# Patient Record
Sex: FEMALE | Race: WHITE
[De-identification: names, ages, dates, MRNs, and addresses within clinical notes are randomized per-mention and may not be internally consistent; named-entity substitution may affect disease eponyms.]

---

## 2018-12-22 ENCOUNTER — HOSPITAL ENCOUNTER (EMERGENCY)
Dept: HOSPITAL 62 - ER | Age: 58
LOS: 1 days | Discharge: HOME | End: 2018-12-23
Payer: MEDICARE

## 2018-12-22 DIAGNOSIS — R19.7: ICD-10-CM

## 2018-12-22 DIAGNOSIS — R39.11: ICD-10-CM

## 2018-12-22 DIAGNOSIS — E89.0: ICD-10-CM

## 2018-12-22 DIAGNOSIS — J45.909: ICD-10-CM

## 2018-12-22 DIAGNOSIS — D72.829: ICD-10-CM

## 2018-12-22 DIAGNOSIS — L29.9: ICD-10-CM

## 2018-12-22 DIAGNOSIS — K21.9: ICD-10-CM

## 2018-12-22 DIAGNOSIS — Z79.899: ICD-10-CM

## 2018-12-22 DIAGNOSIS — R31.9: ICD-10-CM

## 2018-12-22 DIAGNOSIS — Z88.6: ICD-10-CM

## 2018-12-22 DIAGNOSIS — Z88.8: ICD-10-CM

## 2018-12-22 DIAGNOSIS — N20.0: Primary | ICD-10-CM

## 2018-12-22 DIAGNOSIS — R30.0: ICD-10-CM

## 2018-12-22 DIAGNOSIS — Z79.891: ICD-10-CM

## 2018-12-22 DIAGNOSIS — R11.0: ICD-10-CM

## 2018-12-22 PROCEDURE — 96361 HYDRATE IV INFUSION ADD-ON: CPT

## 2018-12-22 PROCEDURE — 36415 COLL VENOUS BLD VENIPUNCTURE: CPT

## 2018-12-22 PROCEDURE — 96375 TX/PRO/DX INJ NEW DRUG ADDON: CPT

## 2018-12-22 PROCEDURE — 76770 US EXAM ABDO BACK WALL COMP: CPT

## 2018-12-22 PROCEDURE — 99284 EMERGENCY DEPT VISIT MOD MDM: CPT

## 2018-12-22 PROCEDURE — 96374 THER/PROPH/DIAG INJ IV PUSH: CPT

## 2018-12-22 PROCEDURE — 80053 COMPREHEN METABOLIC PANEL: CPT

## 2018-12-22 PROCEDURE — 81001 URINALYSIS AUTO W/SCOPE: CPT

## 2018-12-22 PROCEDURE — 87210 SMEAR WET MOUNT SALINE/INK: CPT

## 2018-12-22 PROCEDURE — 85025 COMPLETE CBC W/AUTO DIFF WBC: CPT

## 2018-12-23 VITALS — DIASTOLIC BLOOD PRESSURE: 65 MMHG | SYSTOLIC BLOOD PRESSURE: 125 MMHG

## 2018-12-23 LAB
ADD MANUAL DIFF: NO
ALBUMIN SERPL-MCNC: 4.2 G/DL (ref 3.5–5)
ALP SERPL-CCNC: 81 U/L (ref 38–126)
ALT SERPL-CCNC: 21 U/L (ref 9–52)
ANION GAP SERPL CALC-SCNC: 8 MMOL/L (ref 5–19)
APPEARANCE UR: (no result)
APTT PPP: YELLOW S
AST SERPL-CCNC: 25 U/L (ref 14–36)
BASOPHILS # BLD AUTO: 0 10^3/UL (ref 0–0.2)
BASOPHILS NFR BLD AUTO: 0.3 % (ref 0–2)
BILIRUB DIRECT SERPL-MCNC: 0.2 MG/DL (ref 0–0.4)
BILIRUB SERPL-MCNC: 0.6 MG/DL (ref 0.2–1.3)
BILIRUB UR QL STRIP: NEGATIVE
BUN SERPL-MCNC: 24 MG/DL (ref 7–20)
CALCIUM: 9.8 MG/DL (ref 8.4–10.2)
CHLORIDE SERPL-SCNC: 107 MMOL/L (ref 98–107)
CO2 SERPL-SCNC: 29 MMOL/L (ref 22–30)
EOSINOPHIL # BLD AUTO: 0.2 10^3/UL (ref 0–0.6)
EOSINOPHIL NFR BLD AUTO: 1.7 % (ref 0–6)
EPITHELIALS (WET MOUNT): (no result)
ERYTHROCYTE [DISTWIDTH] IN BLOOD BY AUTOMATED COUNT: 12.4 % (ref 11.5–14)
GLUCOSE SERPL-MCNC: 156 MG/DL (ref 75–110)
GLUCOSE UR STRIP-MCNC: NEGATIVE MG/DL
HCT VFR BLD CALC: 42.1 % (ref 36–47)
HGB BLD-MCNC: 14.4 G/DL (ref 12–15.5)
KETONES UR STRIP-MCNC: NEGATIVE MG/DL
LYMPHOCYTES # BLD AUTO: 1.9 10^3/UL (ref 0.5–4.7)
LYMPHOCYTES NFR BLD AUTO: 17.4 % (ref 13–45)
MCH RBC QN AUTO: 31.8 PG (ref 27–33.4)
MCHC RBC AUTO-ENTMCNC: 34.3 G/DL (ref 32–36)
MCV RBC AUTO: 93 FL (ref 80–97)
MONOCYTES # BLD AUTO: 1 10^3/UL (ref 0.1–1.4)
MONOCYTES NFR BLD AUTO: 9.2 % (ref 3–13)
NEUTROPHILS # BLD AUTO: 7.8 10^3/UL (ref 1.7–8.2)
NEUTS SEG NFR BLD AUTO: 71.4 % (ref 42–78)
NITRITE UR QL STRIP: NEGATIVE
PH UR STRIP: 5 [PH] (ref 5–9)
PLATELET # BLD: 255 10^3/UL (ref 150–450)
POTASSIUM SERPL-SCNC: 4 MMOL/L (ref 3.6–5)
PROT SERPL-MCNC: 7.4 G/DL (ref 6.3–8.2)
PROT UR STRIP-MCNC: NEGATIVE MG/DL
RBC # BLD AUTO: 4.53 10^6/UL (ref 3.72–5.28)
SODIUM SERPL-SCNC: 144 MMOL/L (ref 137–145)
SP GR UR STRIP: 1.02
T.VAGINALIS (WET MOUNT): (no result)
TOTAL CELLS COUNTED % (AUTO): 100 %
UROBILINOGEN UR-MCNC: 4 MG/DL (ref ?–2)
WBC # BLD AUTO: 10.9 10^3/UL (ref 4–10.5)
WBCS (WET MOUNT): (no result)
YEAST (WET MOUNT): (no result)

## 2018-12-23 NOTE — RADIOLOGY REPORT (SQ)
US RETROPERITONEUM



HISTORY: Left flank pain. Evaluate for stone.



COMPARISON: None.



TECHNIQUE: Grayscale and color Doppler ultrasound images of the

kidneys were obtained.



FINDINGS: 



The right kidney measures 10.0 cm in length, which is normal

size. The cortex has normal thickness and echogenicity. There is

no right-sided mass or hydronephrosis. There is a small echogenic

focus.



The left kidney measures 11.6 cm in length, which is normal size.

The cortex has normal thickness and echogenicity. There is no

left-sided mass or hydronephrosis. There is a small echogenic

focus.



The urinary bladder is unremarkable.



IMPRESSION:  



Small echogenic foci in both kidneys which may represent punctate

calculi. No evidence of hydronephrosis.

## 2018-12-23 NOTE — ER DOCUMENT REPORT
ED General





- General


Chief Complaint: Abdominal Pain


Stated Complaint: LEFT SIDE ABDOMINAL PAIN


Time Seen by Provider: 12/22/18 23:41


Notes: 





Patient is a 58-year-old female presents to the emergency department complaining

of left flank pain radiating to her left groin and also urine hesitancy and 

dysuria.  Patient states she noticed this pain this afternoon when she was 

shopping at [employer].  States the pain has increased throughout the day moving 

into her left groin.  Patient states just prior to arrival to the emergency room

she got very nauseated, had multiple episodes of diarrhea, broke out in a sweat 

and was in "excruciating pain."  Patient states she thinks that she passed the 

stone at that time.  Patient states she continues with some dull pain in her 

left flank into her left groin and continues with some minor dysuria.


Patient is also complaining of vaginal itching.  States she finished amoxicillin

3 days ago for an upper respiratory infection.  States she has a history of 

yeast infection but has been trying Monistat at home.  Patient states she has 

not had sexual intercourse in the last 3 years and she is not concerned about 

any sexually transmitted diseases.  Patient states she is worried that it may 

not be atypical yeast infection.


Patient states she has an extensive history of kidney stones and has had stents 

placed on her left and right side.





Past medical history: Asthma, papillary carcinoma with thyroidectomy, GERD, 

seizures, kidney stones, degenerative disc disease


Medications, levothyroxine, Celexa, Protonix, Valium, Flector patches, oxycodone




Allergies: Tegretol, Prednisone





TRAVEL OUTSIDE OF THE [place] IN LAST 30 DAYS: No





- Related Data


Allergies/Adverse Reactions: 


                                        





prednisone Allergy (Verified 12/23/18 01:52)


   











Past Medical History





- General


Information source: Patient





- Social History


Smoking Status: Unknown if Ever Smoked


Family History: Reviewed & Not Pertinent


Patient has suicidal ideation: No


Patient has homicidal ideation: No


Renal/ Medical History: Denies: Hx Peritoneal Dialysis





Review of Systems





- Review of Systems


Constitutional: See HPI


EENT: No symptoms reported


Cardiovascular: No symptoms reported


Respiratory: No symptoms reported


Gastrointestinal: See HPI


Genitourinary: See HPI


Female Genitourinary: See HPI


Musculoskeletal: See HPI


Skin: No symptoms reported


Hematologic/Lymphatic: No symptoms reported


Neurological/Psychological: No symptoms reported





Physical Exam





- Vital signs


Vitals: 


                                        











Temp Pulse Resp BP Pulse Ox


 


 98.0 F   66   16   135/82 H  100 


 


 12/22/18 23:36  12/22/18 23:36  12/22/18 23:36  12/22/18 23:36  12/22/18 23:36














- Notes


Notes: 





GENERAL: Alert, interacts well. No acute distress.


HEAD: Normocephalic, atraumatic.


EYES: Pupils equal, round, and reactive to light. Extraocular movements intact.


ENT: Oral mucosa moist, tongue midline. 


NECK: Full range of motion. Supple. Trachea midline.


LUNGS: Clear to auscultation bilaterally, no wheezes, rales, or rhonchi. No 

respiratory distress.


HEART: Regular rate and rhythm. No murmur


ABDOMEN: Soft, Non-distended. Bowel sounds present in all 4 quadrants.  Minor 

pain radiating from left flank into the left lower groin more so upon deep 

palpation.


EXTREMITIES: Moves all 4 extremities spontaneously. No edema, normal radial and 

dorsalis pedis pulses bilaterally. No cyanosis.


BACK: no cervical, thoracic, lumbar midline tenderness. No saddle anesthesia, 

normal distal neurovascular exam.  Minor left CVA tenderness, no right CVA 

tenderness noted


NEUROLOGICAL: Alert and oriented x3. Normal speech. cranial nerves II through 

XII grossly intact 


PSYCH: Normal affect, normal mood.


SKIN: Warm, dry, normal turgor. No rashes or lesions noted.








Course





- Re-evaluation


Re-evalutation: 


After treatments in the emergency room patient states her pain has since 

resolved.  States she has no longer nauseated and is able to p.o. fluids.  

Patient is a slight leukocytosis of 10.9 on lab work, no signs of anemia, no 

signs of electrolyte abnormalities, no signs of urinary tract infection.  She 

does have blood in her urine which is consistent with potential passing or 

passed kidney stone.  Her pelvic swabs revealed no signs of yeast or bacterial 

vaginosis.  Likely vaginitis or irritation from passing a kidney stone.  

Discussed this at length with patient at bedside.  Discussed pain management for

potential kidney stone passing.  Patient states she has Zofran and pain 

medication at home.  She is stating she does not want any prescription for any 

medications at this time.  States she will take the medication she has at home. 

States she will follow-up with her primary care provider and her nephrologist.


Patient is unable to state the name of her nephrologist but states she has the 

phone number at home.


Patient is afebrile, nontoxic-appearing, stable for discharge at this time.





- Vital Signs


Vital signs: 


                                        











Temp Pulse Resp BP Pulse Ox


 


 97.4 F   63   16   125/65   94 


 


 12/23/18 02:47  12/23/18 02:47  12/23/18 02:47  12/23/18 02:47  12/23/18 02:47














- Laboratory


Result Diagrams: 


                                 12/23/18 00:46





                                 12/23/18 00:46


Laboratory results interpreted by me: 


                                        











  12/23/18 12/23/18 12/23/18





  00:46 00:46 01:29


 


WBC  10.9 H  


 


BUN   24 H 


 


Glucose   156 H 


 


Urine Blood    LARGE H


 


Urine Urobilinogen    4.0 H














Discharge





- Discharge


Clinical Impression: 


 Kidney stone on left side





Hematuria


Qualifiers:


 Hematuria type: unspecified type Qualified Code(s): R31.9 - Hematuria, 

unspecified





Condition: Stable


Disposition: HOME, SELF-CARE


Instructions:  Kidney Stone ([facility]), Vaginitis ([facility])


Additional Instructions: 


As we discussed you are either going to pass or have passed a kidney stone.  

Please stay well-hydrated and drink plenty of clear liquids.  Please take at 

home pain medication and Zofran as prescribed for your discomfort.  Should your 

pain be uncontrolled with medications, you develop a fever, or uncontrolled 

vomiting please return to the emergency room immediately.  Please follow-up with

your primary care provider and then your nephrologist as soon as possible.

## 2022-05-23 ENCOUNTER — NEW PATIENT (OUTPATIENT)
Dept: URBAN - NONMETROPOLITAN AREA CLINIC 1 | Facility: CLINIC | Age: 62
End: 2022-05-23

## 2022-05-23 DIAGNOSIS — H52.4: ICD-10-CM

## 2022-05-23 PROCEDURE — 92004 COMPRE OPH EXAM NEW PT 1/>: CPT

## 2022-05-23 PROCEDURE — 92015 DETERMINE REFRACTIVE STATE: CPT

## 2022-05-23 ASSESSMENT — KERATOMETRY
OD_K2POWER_DIOPTERS: 45.00
OD_K1POWER_DIOPTERS: 44.50
OS_K1POWER_DIOPTERS: 44.25
OD_AXISANGLE2_DEGREES: 168
OS_K2POWER_DIOPTERS: 45.25
OS_AXISANGLE_DEGREES: 63
OD_AXISANGLE_DEGREES: 78
OS_AXISANGLE2_DEGREES: 153

## 2022-05-23 ASSESSMENT — TONOMETRY
OS_IOP_MMHG: 21
OD_IOP_MMHG: 19

## 2022-05-23 ASSESSMENT — VISUAL ACUITY
OS_CC: 20/25(BLURRY)
OD_CC: 20/25

## 2022-05-23 NOTE — PATIENT DISCUSSION
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 Recommend referral to Dr. Marcelina Chung for evaluation, patient agreed.

## 2022-05-23 NOTE — PATIENT DISCUSSION
Patient given Rx for glasses. However, patient reports she has not been happy with her vision for a while now.

## 2022-05-23 NOTE — PATIENT DISCUSSION
Patient has been over wearing her CLs and has caused some irritation OU, stressed importance of not doing this.

## 2022-07-20 ENCOUNTER — CONSULTATION/EVALUATION (OUTPATIENT)
Dept: URBAN - NONMETROPOLITAN AREA CLINIC 1 | Facility: CLINIC | Age: 62
End: 2022-07-20

## 2022-07-20 DIAGNOSIS — H25.13: ICD-10-CM

## 2022-07-20 PROCEDURE — 99214 OFFICE O/P EST MOD 30 MIN: CPT

## 2022-07-20 ASSESSMENT — VISUAL ACUITY
OS_SC: 20/400
OD_CC: 20/30
OD_CC: 20/200
OS_BAT: 20/40
OS_PH: 20/30
OD_BAT: 20/400
OD_SC: 20/400
OD_PAM: 20/30
OS_CC: 20/100
OS_CC: 20/30
OD_PH: 20/100
OS_AM: 20/30

## 2022-07-20 ASSESSMENT — KERATOMETRY
OD_K1POWER_DIOPTERS: 44.50
OS_AXISANGLE2_DEGREES: 153
OD_AXISANGLE_DEGREES: 78
OD_K2POWER_DIOPTERS: 45.00
OD_AXISANGLE2_DEGREES: 168
OS_K1POWER_DIOPTERS: 44.25
OS_K2POWER_DIOPTERS: 45.25
OS_AXISANGLE_DEGREES: 63

## 2022-07-20 NOTE — PATIENT DISCUSSION
Visually significant cataract. Cataract(s) causing symptomatic impairment of visual function not correctable with a tolerable change in glasses or contact lenses, lighting, or non-operative means resulting in specific activity limitations and/or participation restrictions including, but not limited to reading, viewing television, driving, or meeting vocational or recreational needs. Expectation is clearer vision and functional improvement in symptoms as well as reduced glare disability after cataract removal. Order IOL Master and OPD today. Recommend LRI  based on today's OPD testing and lifestyle questionnaire. All questions were answered regarding surgery, including pre- and post-op medications, appointments, activity restrictions and anesthetic usage. The risks, benefits and alternatives, and special risk factors for the patient, were discussed in detail, including but not limited to bleeding, infection, retinal detachment, vitreous loss, problems with the implant, and possible need for additional surgery. Although rare, the possibility of complete vision loss was discussed. The possible need for glasses post-operatively was discussed. Order medical clearance exam based on history of acid reflux Patient elects to proceed with cataract surgery OS. Will schedule at patient's convenience and re-evaluate OD in the future.

## 2022-07-20 NOTE — PATIENT DISCUSSION
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 Recommend referral to Dr. Christy Rosenthal for evaluation, patient agreed.

## 2022-07-20 NOTE — PATIENT DISCUSSION
Monovision: The patient has a history of monovision correction with contact lenses and has adjusted and done well with this. The patient understands binocular visual expectations. The patient elects to proceed with cataract extraction with one eye corrected for distance and the other eye for near. Distance eye will be OS and near eye will be OD.   Pt expressed understanding. Pt elects to proceed with LRI/LenSx.

## 2022-07-20 NOTE — PATIENT DISCUSSION
Explained to the patient that she should only order enough CL to get her through until her cataract evaluation/surgery, and not to update her glasses if she is proceeding with cataract surgery soon. Patient expressed understanding.

## 2022-07-26 ENCOUNTER — PRE-OP/H&P (OUTPATIENT)
Dept: URBAN - NONMETROPOLITAN AREA CLINIC 1 | Facility: CLINIC | Age: 62
End: 2022-07-26

## 2022-07-26 VITALS
HEIGHT: 63 IN | HEART RATE: 84 BPM | SYSTOLIC BLOOD PRESSURE: 128 MMHG | BODY MASS INDEX: 26.4 KG/M2 | WEIGHT: 149 LBS | DIASTOLIC BLOOD PRESSURE: 78 MMHG

## 2022-07-26 DIAGNOSIS — Z01.818: ICD-10-CM

## 2022-07-26 PROCEDURE — 99213 OFFICE O/P EST LOW 20 MIN: CPT

## 2022-07-26 ASSESSMENT — KERATOMETRY
OD_K1POWER_DIOPTERS: 44.50
OD_K2POWER_DIOPTERS: 45.00
OD_AXISANGLE_DEGREES: 78
OS_AXISANGLE_DEGREES: 63
OD_AXISANGLE2_DEGREES: 168
OS_K2POWER_DIOPTERS: 45.25
OS_K1POWER_DIOPTERS: 44.25
OS_AXISANGLE2_DEGREES: 153

## 2022-07-26 ASSESSMENT — VISUAL ACUITY
OD_CC: 20/200
OD_PAM: 20/30
OD_SC: 20/400
OS_CC: 20/30
OS_BAT: 20/40
OS_SC: 20/400
OS_AM: 20/30
OS_PH: 20/30
OS_CC: 20/100
OD_CC: 20/30
OD_BAT: 20/400
OD_PH: 20/100

## 2022-08-02 ENCOUNTER — SURGERY/PROCEDURE (OUTPATIENT)
Dept: URBAN - NONMETROPOLITAN AREA CLINIC 2 | Facility: CLINIC | Age: 62
End: 2022-08-02

## 2022-08-02 DIAGNOSIS — H25.11: ICD-10-CM

## 2022-08-02 PROCEDURE — 66984 XCAPSL CTRC RMVL W/O ECP: CPT

## 2022-08-02 PROCEDURE — S9986 NOT MEDICALLY NECESSARY SVC: HCPCS

## 2022-08-02 ASSESSMENT — KERATOMETRY
OS_AXISANGLE_DEGREES: 63
OS_K2POWER_DIOPTERS: 45.25
OD_AXISANGLE_DEGREES: 78
OS_K1POWER_DIOPTERS: 44.25
OD_K2POWER_DIOPTERS: 45.00
OS_AXISANGLE2_DEGREES: 153
OD_K1POWER_DIOPTERS: 44.50
OD_AXISANGLE2_DEGREES: 168

## 2022-08-03 ENCOUNTER — POST OP/EVAL OF SECOND EYE (OUTPATIENT)
Dept: URBAN - NONMETROPOLITAN AREA CLINIC 1 | Facility: CLINIC | Age: 62
End: 2022-08-03

## 2022-08-03 DIAGNOSIS — H25.12: ICD-10-CM

## 2022-08-03 PROCEDURE — 99213 OFFICE O/P EST LOW 20 MIN: CPT

## 2022-08-03 ASSESSMENT — VISUAL ACUITY
OD_SC: 20/50-2
OD_SC: J1+
OS_SC: 20/400

## 2022-08-03 ASSESSMENT — TONOMETRY
OD_IOP_MMHG: 17
OS_IOP_MMHG: 18

## 2022-08-03 ASSESSMENT — KERATOMETRY
OS_K2POWER_DIOPTERS: 45.25
OD_K2POWER_DIOPTERS: 45.00
OD_AXISANGLE2_DEGREES: 168
OS_K1POWER_DIOPTERS: 44.25
OS_AXISANGLE2_DEGREES: 153
OS_AXISANGLE_DEGREES: 63
OD_K1POWER_DIOPTERS: 44.50
OD_AXISANGLE_DEGREES: 78

## 2022-08-03 NOTE — PATIENT DISCUSSION
Wound intact. Patient is stable and doing well. Continue post op drops as directed. Continue to monitor.

## 2022-08-03 NOTE — PATIENT DISCUSSION
8/3/22 Patient complains of imbalanced in vision. She states that she has to close her OS to see better. Patient to proceed with cataract surgery.

## 2022-08-03 NOTE — PATIENT DISCUSSION
Discussed diagnosis in detail with patient. Patient can not take pills due to having surgery on her mouth, she would like for us to send in a liquid. Recommend HOT compresses 10-15 mins on and 45 mins off. Will call pharmacy to send in RX for patient.

## 2022-08-09 ENCOUNTER — POST-OP (OUTPATIENT)
Dept: URBAN - NONMETROPOLITAN AREA CLINIC 1 | Facility: CLINIC | Age: 62
End: 2022-08-09

## 2022-08-09 DIAGNOSIS — Z98.41: ICD-10-CM

## 2022-08-09 PROCEDURE — 99024 POSTOP FOLLOW-UP VISIT: CPT

## 2022-08-09 ASSESSMENT — TONOMETRY
OD_IOP_MMHG: 14
OS_IOP_MMHG: 14

## 2022-08-09 ASSESSMENT — VISUAL ACUITY
OS_SC: 20/400
OS_PH: 20/80
OD_SC: 20/25

## 2022-08-09 ASSESSMENT — KERATOMETRY
OD_K2POWER_DIOPTERS: 45.00
OD_AXISANGLE2_DEGREES: 168
OD_AXISANGLE_DEGREES: 78
OD_K1POWER_DIOPTERS: 44.50
OS_K2POWER_DIOPTERS: 45.25
OS_AXISANGLE2_DEGREES: 153
OS_K1POWER_DIOPTERS: 44.25
OS_AXISANGLE_DEGREES: 63

## 2022-08-16 ENCOUNTER — PRE-OP/H&P (OUTPATIENT)
Dept: URBAN - NONMETROPOLITAN AREA CLINIC 1 | Facility: CLINIC | Age: 62
End: 2022-08-16

## 2022-08-16 VITALS
HEART RATE: 84 BPM | SYSTOLIC BLOOD PRESSURE: 124 MMHG | BODY MASS INDEX: 26.4 KG/M2 | HEIGHT: 63 IN | WEIGHT: 149 LBS | DIASTOLIC BLOOD PRESSURE: 76 MMHG

## 2022-08-16 DIAGNOSIS — Z01.818: ICD-10-CM

## 2022-08-16 PROCEDURE — 99499 UNLISTED E&M SERVICE: CPT

## 2022-08-16 ASSESSMENT — KERATOMETRY
OS_AXISANGLE2_DEGREES: 153
OS_AXISANGLE_DEGREES: 63
OS_K1POWER_DIOPTERS: 44.25
OS_K2POWER_DIOPTERS: 45.25
OD_K2POWER_DIOPTERS: 45.00
OD_AXISANGLE_DEGREES: 78
OD_AXISANGLE2_DEGREES: 168
OD_K1POWER_DIOPTERS: 44.50

## 2022-08-16 ASSESSMENT — VISUAL ACUITY
OS_CC: 20/100
OS_CC: 20/30
OS_AM: 20/30
OS_PH: 20/30
OS_BAT: 20/40
OS_SC: 20/400
OD_SC: 20/25

## 2022-09-13 ENCOUNTER — SURGERY/PROCEDURE (OUTPATIENT)
Dept: URBAN - NONMETROPOLITAN AREA CLINIC 2 | Facility: CLINIC | Age: 62
End: 2022-09-13

## 2022-09-13 DIAGNOSIS — H25.12: ICD-10-CM

## 2022-09-13 PROCEDURE — 66984 XCAPSL CTRC RMVL W/O ECP: CPT

## 2022-09-13 PROCEDURE — S9986 NOT MEDICALLY NECESSARY SVC: HCPCS

## 2022-09-14 ENCOUNTER — POST-OP (OUTPATIENT)
Dept: URBAN - NONMETROPOLITAN AREA CLINIC 1 | Facility: CLINIC | Age: 62
End: 2022-09-14

## 2022-09-14 DIAGNOSIS — Z98.41: ICD-10-CM

## 2022-09-14 DIAGNOSIS — Z98.42: ICD-10-CM

## 2022-09-14 PROCEDURE — 99024 POSTOP FOLLOW-UP VISIT: CPT

## 2022-09-14 ASSESSMENT — KERATOMETRY
OD_AXISANGLE_DEGREES: 78
OS_K2POWER_DIOPTERS: 45.25
OD_K2POWER_DIOPTERS: 45.00
OS_AXISANGLE_DEGREES: 63
OS_K1POWER_DIOPTERS: 44.25
OD_K1POWER_DIOPTERS: 44.50
OD_AXISANGLE2_DEGREES: 168
OS_AXISANGLE2_DEGREES: 153

## 2022-09-14 ASSESSMENT — VISUAL ACUITY
OU_SC: 20/20
OD_SC: 20/20-2
OS_SC: 20/25+2
OU_SC: 20/20

## 2022-09-14 ASSESSMENT — TONOMETRY
OS_IOP_MMHG: 16
OD_IOP_MMHG: 14

## 2022-09-20 ENCOUNTER — POST-OP (OUTPATIENT)
Dept: URBAN - NONMETROPOLITAN AREA CLINIC 1 | Facility: CLINIC | Age: 62
End: 2022-09-20

## 2022-09-20 DIAGNOSIS — Z98.41: ICD-10-CM

## 2022-09-20 DIAGNOSIS — Z98.42: ICD-10-CM

## 2022-09-20 PROCEDURE — 99024 POSTOP FOLLOW-UP VISIT: CPT

## 2022-09-20 ASSESSMENT — TONOMETRY
OD_IOP_MMHG: 14
OS_IOP_MMHG: 14

## 2022-09-20 ASSESSMENT — KERATOMETRY
OS_K1POWER_DIOPTERS: 44.25
OS_AXISANGLE2_DEGREES: 153
OD_AXISANGLE_DEGREES: 78
OD_K2POWER_DIOPTERS: 45.00
OD_K1POWER_DIOPTERS: 44.50
OS_AXISANGLE_DEGREES: 63
OD_AXISANGLE2_DEGREES: 168
OS_K2POWER_DIOPTERS: 45.25

## 2022-09-20 ASSESSMENT — VISUAL ACUITY
OS_SC: 20/25
OD_SC: 20/25

## 2022-09-20 NOTE — PATIENT DISCUSSION
Discussed diagnosis in detail with patient. Wound intact. Continue all post op drops as directed. Continue to monitor.

## 2022-11-04 ENCOUNTER — EMERGENCY VISIT (OUTPATIENT)
Dept: URBAN - NONMETROPOLITAN AREA CLINIC 1 | Facility: CLINIC | Age: 62
End: 2022-11-04

## 2022-11-04 DIAGNOSIS — G43.B0: ICD-10-CM

## 2022-11-04 PROCEDURE — 92250 FUNDUS PHOTOGRAPHY W/I&R: CPT

## 2022-11-04 PROCEDURE — 99213 OFFICE O/P EST LOW 20 MIN: CPT

## 2022-11-04 RX ORDER — KETOROLAC TROMETHAMINE 5 MG/ML: 1 SOLUTION OPHTHALMIC

## 2022-11-04 ASSESSMENT — KERATOMETRY
OS_AXISANGLE2_DEGREES: 153
OD_AXISANGLE2_DEGREES: 168
OD_AXISANGLE_DEGREES: 78
OD_K2POWER_DIOPTERS: 45.00
OS_AXISANGLE_DEGREES: 63
OD_K1POWER_DIOPTERS: 44.50
OS_K1POWER_DIOPTERS: 44.25
OS_K2POWER_DIOPTERS: 45.25

## 2022-11-04 ASSESSMENT — TONOMETRY
OD_IOP_MMHG: 16
OS_IOP_MMHG: 16

## 2022-11-04 ASSESSMENT — VISUAL ACUITY
OS_SC: 20/25
OD_SC: 20/25

## 2022-11-04 NOTE — PATIENT DISCUSSION
11/4/22:  Mild complaint today. Start Ketorolac OU BID-TID to see if this helps with her light sensitivity and/or irritation.

## 2022-11-07 ENCOUNTER — EMERGENCY VISIT (OUTPATIENT)
Dept: URBAN - NONMETROPOLITAN AREA CLINIC 1 | Facility: CLINIC | Age: 62
End: 2022-11-07

## 2022-11-07 DIAGNOSIS — G43.B0: ICD-10-CM

## 2022-11-07 PROCEDURE — 92250 FUNDUS PHOTOGRAPHY W/I&R: CPT

## 2022-11-07 PROCEDURE — 99213 OFFICE O/P EST LOW 20 MIN: CPT

## 2022-11-07 ASSESSMENT — KERATOMETRY
OD_K2POWER_DIOPTERS: 45.00
OD_AXISANGLE_DEGREES: 78
OS_AXISANGLE_DEGREES: 63
OS_K2POWER_DIOPTERS: 45.25
OS_K1POWER_DIOPTERS: 44.25
OS_AXISANGLE2_DEGREES: 153
OD_K1POWER_DIOPTERS: 44.50
OD_AXISANGLE2_DEGREES: 168

## 2022-11-07 ASSESSMENT — TONOMETRY
OD_IOP_MMHG: 16
OS_IOP_MMHG: 16

## 2022-11-07 ASSESSMENT — VISUAL ACUITY: OU_SC: 20/30

## 2022-11-07 NOTE — PATIENT DISCUSSION
Again, recommended follow up with primary care physician or neurologist if attacks continue. Asked her to  discussed having her carotid artery checked to ensure she is not in beginning stages of possible TIA. Patient expressed understanding. Patient reports history of extremely high triglycerides. Will send notes to PCP today.

## 2023-01-03 ENCOUNTER — FOLLOW UP (OUTPATIENT)
Dept: URBAN - NONMETROPOLITAN AREA CLINIC 1 | Facility: CLINIC | Age: 63
End: 2023-01-03

## 2023-01-03 DIAGNOSIS — Z96.1: ICD-10-CM

## 2023-01-03 PROCEDURE — 99213 OFFICE O/P EST LOW 20 MIN: CPT

## 2023-01-03 RX ORDER — KETOROLAC TROMETHAMINE 5 MG/ML: 1 SOLUTION OPHTHALMIC TWICE A DAY

## 2023-01-03 ASSESSMENT — VISUAL ACUITY
OS_SC: 20/25
OU_SC: 20/25
OD_SC: 20/25

## 2023-01-03 ASSESSMENT — TONOMETRY
OS_IOP_MMHG: 16
OD_IOP_MMHG: 17

## 2023-01-03 ASSESSMENT — KERATOMETRY
OS_K2POWER_DIOPTERS: 45.25
OD_AXISANGLE_DEGREES: 78
OS_AXISANGLE2_DEGREES: 153
OS_AXISANGLE_DEGREES: 63
OS_K1POWER_DIOPTERS: 44.25
OD_AXISANGLE2_DEGREES: 168
OD_K1POWER_DIOPTERS: 44.50
OD_K2POWER_DIOPTERS: 45.00

## 2023-01-03 NOTE — PATIENT DISCUSSION
PREVIOUSLY: Again, recommended follow up with primary care physician or neurologist if attacks continue. Asked her to  discussed having her carotid artery checked to ensure she is not in beginning stages of possible TIA. Patient expressed understanding. Patient reports history of extremely high triglycerides. Will send notes to PCP today.

## 2023-01-03 NOTE — PATIENT DISCUSSION
1/3/22: Patient states all her test came back negative for TIA. Patient states the Ketorolac did help but it would come back. Patient states her only complaint now is seeing lots of floaters.

## 2023-04-05 ENCOUNTER — CLINIC PROCEDURE ONLY (OUTPATIENT)
Dept: URBAN - NONMETROPOLITAN AREA CLINIC 1 | Facility: CLINIC | Age: 63
End: 2023-04-05

## 2023-04-05 DIAGNOSIS — H26.491: ICD-10-CM

## 2023-04-05 PROCEDURE — 66821 AFTER CATARACT LASER SURGERY: CPT

## 2023-04-05 ASSESSMENT — VISUAL ACUITY
OU_SC: 20/25
OD_SC: 20/25+2
OD_BAT: 20/50-1
OU_SC: 20/20
OS_SC: 20/20

## 2023-04-05 ASSESSMENT — KERATOMETRY
OD_K1POWER_DIOPTERS: 44.50
OD_AXISANGLE_DEGREES: 78
OS_K1POWER_DIOPTERS: 44.25
OD_AXISANGLE2_DEGREES: 168
OS_K2POWER_DIOPTERS: 45.25
OD_K2POWER_DIOPTERS: 45.00
OS_AXISANGLE2_DEGREES: 153
OS_AXISANGLE_DEGREES: 63

## 2023-04-05 ASSESSMENT — TONOMETRY
OD_IOP_MMHG: 15
OS_IOP_MMHG: 15

## 2023-05-08 ENCOUNTER — POST-OP (OUTPATIENT)
Dept: URBAN - NONMETROPOLITAN AREA CLINIC 1 | Facility: CLINIC | Age: 63
End: 2023-05-08

## 2023-05-08 DIAGNOSIS — Z98.890: ICD-10-CM

## 2023-05-08 PROCEDURE — 99024 POSTOP FOLLOW-UP VISIT: CPT

## 2023-05-08 ASSESSMENT — KERATOMETRY
OD_K2POWER_DIOPTERS: 45.00
OD_AXISANGLE2_DEGREES: 168
OS_K1POWER_DIOPTERS: 44.25
OS_K2POWER_DIOPTERS: 45.25
OD_K1POWER_DIOPTERS: 44.50
OS_AXISANGLE2_DEGREES: 153
OD_AXISANGLE_DEGREES: 78
OS_AXISANGLE_DEGREES: 63

## 2023-05-08 ASSESSMENT — VISUAL ACUITY
OS_SC: 20/20
OD_SC: 20/20-2

## 2023-05-08 ASSESSMENT — TONOMETRY
OS_IOP_MMHG: 14
OD_IOP_MMHG: 14

## 2023-08-03 ENCOUNTER — EMERGENCY VISIT (OUTPATIENT)
Dept: RURAL CLINIC 3 | Facility: CLINIC | Age: 63
End: 2023-08-03

## 2023-08-03 DIAGNOSIS — H43.813: ICD-10-CM

## 2023-08-03 PROCEDURE — 99214 OFFICE O/P EST MOD 30 MIN: CPT

## 2023-08-03 PROCEDURE — 92250 FUNDUS PHOTOGRAPHY W/I&R: CPT

## 2023-08-03 ASSESSMENT — KERATOMETRY
OD_K2POWER_DIOPTERS: 45.00
OD_AXISANGLE_DEGREES: 78
OS_AXISANGLE2_DEGREES: 153
OS_K2POWER_DIOPTERS: 45.25
OS_AXISANGLE_DEGREES: 63
OD_AXISANGLE2_DEGREES: 168
OD_K1POWER_DIOPTERS: 44.50
OS_K1POWER_DIOPTERS: 44.25

## 2023-08-03 ASSESSMENT — VISUAL ACUITY
OD_SC: 20/20
OS_SC: 20/20

## 2023-08-03 ASSESSMENT — TONOMETRY
OD_IOP_MMHG: 17
OS_IOP_MMHG: 16